# Patient Record
Sex: FEMALE | URBAN - METROPOLITAN AREA
[De-identification: names, ages, dates, MRNs, and addresses within clinical notes are randomized per-mention and may not be internally consistent; named-entity substitution may affect disease eponyms.]

---

## 2023-05-09 ENCOUNTER — APPOINTMENT (OUTPATIENT)
Dept: URBAN - METROPOLITAN AREA CLINIC 270 | Age: 58
Setting detail: DERMATOLOGY
End: 2023-05-10

## 2023-05-09 DIAGNOSIS — R21 RASH AND OTHER NONSPECIFIC SKIN ERUPTION: ICD-10-CM

## 2023-05-09 DIAGNOSIS — L57.8 OTHER SKIN CHANGES DUE TO CHRONIC EXPOSURE TO NONIONIZING RADIATION: ICD-10-CM

## 2023-05-09 DIAGNOSIS — L81.4 OTHER MELANIN HYPERPIGMENTATION: ICD-10-CM

## 2023-05-09 PROCEDURE — OTHER PRESCRIPTION MEDICATION MANAGEMENT: OTHER

## 2023-05-09 PROCEDURE — 99203 OFFICE O/P NEW LOW 30 MIN: CPT

## 2023-05-09 PROCEDURE — OTHER PRESCRIPTION: OTHER

## 2023-05-09 PROCEDURE — OTHER ADDITIONAL NOTES: OTHER

## 2023-05-09 PROCEDURE — OTHER COUNSELING: OTHER

## 2023-05-09 PROCEDURE — OTHER MIPS QUALITY: OTHER

## 2023-05-09 RX ORDER — TRIAMCINOLONE ACETONIDE 1 MG/G
CREAM TOPICAL BID
Qty: 30 | Refills: 4 | Status: ERX | COMMUNITY
Start: 2023-05-09

## 2023-05-09 RX ORDER — TRETIONIN 0.25 MG/G
CREAM TOPICAL
Qty: 45 | Refills: 3 | Status: ERX | COMMUNITY
Start: 2023-05-09

## 2023-05-09 ASSESSMENT — LOCATION SIMPLE DESCRIPTION DERM
LOCATION SIMPLE: LEFT 5TH TOE
LOCATION SIMPLE: LEFT CHEEK
LOCATION SIMPLE: LEFT THIGH
LOCATION SIMPLE: ABDOMEN
LOCATION SIMPLE: RIGHT BUTTOCK
LOCATION SIMPLE: RIGHT CHEEK
LOCATION SIMPLE: RIGHT UPPER BACK
LOCATION SIMPLE: LEFT UPPER ARM

## 2023-05-09 ASSESSMENT — LOCATION DETAILED DESCRIPTION DERM
LOCATION DETAILED: LEFT INFERIOR CENTRAL MALAR CHEEK
LOCATION DETAILED: RIGHT INFERIOR CENTRAL MALAR CHEEK
LOCATION DETAILED: RIGHT BUTTOCK
LOCATION DETAILED: LEFT DORSAL 5TH TOE
LOCATION DETAILED: PERIUMBILICAL SKIN
LOCATION DETAILED: LEFT ANTERIOR PROXIMAL UPPER ARM
LOCATION DETAILED: RIGHT SUPERIOR MEDIAL UPPER BACK
LOCATION DETAILED: LEFT ANTERIOR PROXIMAL THIGH

## 2023-05-09 ASSESSMENT — LOCATION ZONE DERM
LOCATION ZONE: LEG
LOCATION ZONE: TOE
LOCATION ZONE: TRUNK
LOCATION ZONE: ARM
LOCATION ZONE: FACE

## 2023-05-09 NOTE — PROCEDURE: ADDITIONAL NOTES
Additional Notes: No signs of infection, and discussed no need for mupirocin at this time. Try to avoid picking and popping, use regular moisturization instead. Spot tx tac cr thinly bid prn to actively pruritic new lesions. Discussed Covid can sometimes trigger eruption or chronic eczema however not many active lesions to evaluate today.

## 2023-05-09 NOTE — PROCEDURE: COUNSELING
Sunscreen Recommendations: Encouraged to use daily SPF 30+, especially for face/neck. Discussed importance of reapplying SPF to skin during times of prolonged sun exposure.
Detail Level: Detailed

## 2023-05-09 NOTE — PROCEDURE: ADDITIONAL NOTES
Additional Notes: Pt had laser therapy 7 yrs ago and had a bad reaction with hyperpigmentation. Also tans frequently. Wants treatment for lentigines to cheeks.\\nPt requesting laser spot treatment - discussed she could make appt with  in [place] to discuss further. Will begin tretinoin at this time. Discussed starting ZO brightening kit but pt states she may purchase at a later date - she states she has used ZO in the past with little improvement.\\nEmphasized importance of daily SPF and sun avoidance to prevent further darkening.\\n\\nPt states if insurance doesn’t cover tretinoin rx, she may consider purchasing obagi tret in office. Additional Notes: Pt had laser therapy 7 yrs ago and had a bad reaction with hyperpigmentation. Also tans frequently. Wants treatment for lentigines to cheeks.\\nPt requesting laser spot treatment - discussed she could make appt with  in [place] to discuss further. Will begin tretinoin at this time. Discussed starting ZO brightening kit but pt states she may purchase at a later date - she states she has used ZO in the past with little improvement.\\nEmphasized importance of daily SPF and sun avoidance to prevent further darkening.\\n\\nPt states if insurance doesn’t cover tretinoin rx, she may consider purchasing obagi tret in office.

## 2023-05-09 NOTE — HPI: SKIN LESION
What Type Of Note Output Would You Prefer (Optional)?: Standard Output
How Severe Is Your Skin Lesion?: moderate
Has Your Skin Lesion Been Treated?: been treated
Is This A New Presentation, Or A Follow-Up?: Skin Lesions
Additional History: Pt complains of red bumps intermittently occurring to foot, extremities, upper back, and near lower abdominal and inguinal fold for the past few months. She states they are itchy. She does not notice any sx at these sites prior to onset of bump. She often finds herself picking at them and squeezing them, but not much comes out. Her PCP gave her mupirocin which she feels helped. She is concerned with scars at sites of old foci, as well as one actively itchy area today to left foot near web space 4-5.\\nOf note, pt states she had a severe reaction to her initial Covid vaccine with illness and 9months of diarrhea, for which she later received colonoscopy for (inconclusive results). She had Covid in December 2022 with again severe illness and lingering symptoms/diarrhea and wonders if her skin condition could be related to Covid, since she previously had not had skin issues.